# Patient Record
(demographics unavailable — no encounter records)

---

## 2025-06-05 NOTE — HISTORY OF PRESENT ILLNESS
[FreeTextEntry1] : ---- 52 Y/O P3 HERE FOR ANNUAL EXAM;PT WAS TOLD SHE HAS STAGE 1 OF UTERUS;PT VERY UNSURE. SIMPLE ENDOMETRIAL HYPERPLASIA;NO ATYPIA; PT WAS GIVEN PROVERA 10MG X 90 DAYS. PMHX;/DM PSHX;/ BTL D/C;POLYP SOCIAL HX;-ETOH -CIGG  STD;  HSV   /        STD PREVENTION DISCUSSED FAMILY HISTORY OF BREAST CANCER;NO REVIEW OF SYMPTOMS DONE; ALLERGIES; pt answered PCN Medication reconciliation was completed by reviewing, with the patient's involvement, the patient's current outpatient medications and those  ordered for the patient today.           PE;BREASTS;- MASSES DC NODES SBE   CHAPERONE ABDOMEN;SOFT NT ND NL GENITALIA VAGINA -DC CERVIX;-CMT UTERUS;NL SIZE NT AV ADNEXA; NT - MASSES   A;P;ANNUAL EXAM;HX OF ENDOMETRIAL HYPERPLASIA;NO ATYPIA -PAP GC CHLAMYDIA -ZAMZAM -RTC 3 MONTHS FOR REPEAT ENDO BX -ANSWERED QUESTIONS.